# Patient Record
Sex: FEMALE | Race: OTHER | HISPANIC OR LATINO | URBAN - METROPOLITAN AREA
[De-identification: names, ages, dates, MRNs, and addresses within clinical notes are randomized per-mention and may not be internally consistent; named-entity substitution may affect disease eponyms.]

---
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
## 2021-06-05 NOTE — CONSULT NOTE PEDS - ASSESSMENT
18 yo F with hx of renal cyst admitted with NARA in setting of viral gastroenteritis and thrombocytopenia. Her creatinine is improving with IVF alone, which is suggestive of a pre-renal etiology. Other causes of NARA associated with thrombocytopenia include HUS, SLE, and *****.  Should also evaluate for *****.     1.   2. 16 yo F with hx of renal cyst admitted with NARA in setting of viral gastroenteritis and thrombocytopenia. Her creatinine is improving with IVF alone, which is suggestive of a pre-renal etiology. Causes of NARA associated with thrombocytopenia include HUS and SLE. Also recommend a complete GN workup as indicated below.     1. Repeat CMP, Mg, Phos this evening to trend Creatinine  2. Continue IVF  3. Send CATERINA, dsDNA, ANCA for SLE evaluation  4. GI PCR for HUS evaluation   5. ASLO for PSGN   6. Urine eosinophil count for AIN   7. Follow up C3, C4  8. Follow up Urine Cx   9. Heme following, Rheum c/s pending 16 yo F with hx of renal cyst admitted with NARA in setting of viral gastroenteritis and thrombocytopenia. Her creatinine is improving with IVF alone, which is suggestive of a pre-renal etiology, although also with concern for interstitial nephritis possibly related to Motrin. Causes of NARA associated with thrombocytopenia include HUS and SLE. Also recommend a complete GN workup as indicated below.     1. Repeat CMP, Mg, Phos this evening to trend Creatinine  2. Continue IVF  3. Send CATERINA, dsDNA, ANCA for SLE evaluation  4. GI PCR for HUS evaluation   5. ASLO for PSGN   6. Urine eosinophil count for AIN   7. Follow up C3, C4  8. Follow up Urine Cx   9. Heme following, Rheum c/s pending 36.9

## 2021-12-09 ENCOUNTER — EMERGENCY (EMERGENCY)
Facility: HOSPITAL | Age: 21
LOS: 1 days | Discharge: HOME | End: 2021-12-09
Attending: EMERGENCY MEDICINE
Payer: MEDICAID

## 2021-12-09 VITALS
HEART RATE: 76 BPM | HEIGHT: 61 IN | TEMPERATURE: 98 F | DIASTOLIC BLOOD PRESSURE: 73 MMHG | RESPIRATION RATE: 16 BRPM | SYSTOLIC BLOOD PRESSURE: 109 MMHG | OXYGEN SATURATION: 98 % | WEIGHT: 169.09 LBS

## 2021-12-09 LAB
ALBUMIN SERPL ELPH-MCNC: 4 G/DL — SIGNIFICANT CHANGE UP (ref 3.5–5)
ALP SERPL-CCNC: 113 U/L — SIGNIFICANT CHANGE UP (ref 40–120)
ALT FLD-CCNC: 48 U/L DA — SIGNIFICANT CHANGE UP (ref 10–60)
ANION GAP SERPL CALC-SCNC: 6 MMOL/L — SIGNIFICANT CHANGE UP (ref 5–17)
APPEARANCE UR: CLEAR — SIGNIFICANT CHANGE UP
AST SERPL-CCNC: 18 U/L — SIGNIFICANT CHANGE UP (ref 10–40)
BACTERIA # UR AUTO: ABNORMAL /HPF
BASOPHILS # BLD AUTO: 0.09 K/UL — SIGNIFICANT CHANGE UP (ref 0–0.2)
BASOPHILS NFR BLD AUTO: 0.8 % — SIGNIFICANT CHANGE UP (ref 0–2)
BILIRUB SERPL-MCNC: 0.3 MG/DL — SIGNIFICANT CHANGE UP (ref 0.2–1.2)
BILIRUB UR-MCNC: NEGATIVE — SIGNIFICANT CHANGE UP
BUN SERPL-MCNC: 11 MG/DL — SIGNIFICANT CHANGE UP (ref 7–18)
CALCIUM SERPL-MCNC: 8.8 MG/DL — SIGNIFICANT CHANGE UP (ref 8.4–10.5)
CHLORIDE SERPL-SCNC: 107 MMOL/L — SIGNIFICANT CHANGE UP (ref 96–108)
CO2 SERPL-SCNC: 26 MMOL/L — SIGNIFICANT CHANGE UP (ref 22–31)
COLOR SPEC: YELLOW — SIGNIFICANT CHANGE UP
CREAT SERPL-MCNC: 0.55 MG/DL — SIGNIFICANT CHANGE UP (ref 0.5–1.3)
DIFF PNL FLD: ABNORMAL
EOSINOPHIL # BLD AUTO: 0.2 K/UL — SIGNIFICANT CHANGE UP (ref 0–0.5)
EOSINOPHIL NFR BLD AUTO: 1.7 % — SIGNIFICANT CHANGE UP (ref 0–6)
EPI CELLS # UR: SIGNIFICANT CHANGE UP /HPF
GLUCOSE SERPL-MCNC: 88 MG/DL — SIGNIFICANT CHANGE UP (ref 70–99)
GLUCOSE UR QL: NEGATIVE — SIGNIFICANT CHANGE UP
HCG UR QL: NEGATIVE — SIGNIFICANT CHANGE UP
HCT VFR BLD CALC: 38.9 % — SIGNIFICANT CHANGE UP (ref 34.5–45)
HGB BLD-MCNC: 13.4 G/DL — SIGNIFICANT CHANGE UP (ref 11.5–15.5)
IMM GRANULOCYTES NFR BLD AUTO: 0.2 % — SIGNIFICANT CHANGE UP (ref 0–1.5)
KETONES UR-MCNC: NEGATIVE — SIGNIFICANT CHANGE UP
LEUKOCYTE ESTERASE UR-ACNC: NEGATIVE — SIGNIFICANT CHANGE UP
LIDOCAIN IGE QN: 69 U/L — LOW (ref 73–393)
LYMPHOCYTES # BLD AUTO: 3.7 K/UL — HIGH (ref 1–3.3)
LYMPHOCYTES # BLD AUTO: 31.7 % — SIGNIFICANT CHANGE UP (ref 13–44)
MCHC RBC-ENTMCNC: 30 PG — SIGNIFICANT CHANGE UP (ref 27–34)
MCHC RBC-ENTMCNC: 34.4 GM/DL — SIGNIFICANT CHANGE UP (ref 32–36)
MCV RBC AUTO: 87.2 FL — SIGNIFICANT CHANGE UP (ref 80–100)
MONOCYTES # BLD AUTO: 0.78 K/UL — SIGNIFICANT CHANGE UP (ref 0–0.9)
MONOCYTES NFR BLD AUTO: 6.7 % — SIGNIFICANT CHANGE UP (ref 2–14)
NEUTROPHILS # BLD AUTO: 6.9 K/UL — SIGNIFICANT CHANGE UP (ref 1.8–7.4)
NEUTROPHILS NFR BLD AUTO: 58.9 % — SIGNIFICANT CHANGE UP (ref 43–77)
NITRITE UR-MCNC: POSITIVE
NRBC # BLD: 0 /100 WBCS — SIGNIFICANT CHANGE UP (ref 0–0)
PH UR: 5 — SIGNIFICANT CHANGE UP (ref 5–8)
PLATELET # BLD AUTO: 241 K/UL — SIGNIFICANT CHANGE UP (ref 150–400)
POTASSIUM SERPL-MCNC: 3.7 MMOL/L — SIGNIFICANT CHANGE UP (ref 3.5–5.3)
POTASSIUM SERPL-SCNC: 3.7 MMOL/L — SIGNIFICANT CHANGE UP (ref 3.5–5.3)
PROT SERPL-MCNC: 7.8 G/DL — SIGNIFICANT CHANGE UP (ref 6–8.3)
PROT UR-MCNC: 15
RBC # BLD: 4.46 M/UL — SIGNIFICANT CHANGE UP (ref 3.8–5.2)
RBC # FLD: 12.4 % — SIGNIFICANT CHANGE UP (ref 10.3–14.5)
RBC CASTS # UR COMP ASSIST: SIGNIFICANT CHANGE UP /HPF (ref 0–2)
SODIUM SERPL-SCNC: 139 MMOL/L — SIGNIFICANT CHANGE UP (ref 135–145)
SP GR SPEC: 1.02 — SIGNIFICANT CHANGE UP (ref 1.01–1.02)
UROBILINOGEN FLD QL: NEGATIVE — SIGNIFICANT CHANGE UP
WBC # BLD: 11.69 K/UL — HIGH (ref 3.8–10.5)
WBC # FLD AUTO: 11.69 K/UL — HIGH (ref 3.8–10.5)
WBC UR QL: SIGNIFICANT CHANGE UP /HPF (ref 0–5)

## 2021-12-09 PROCEDURE — 76856 US EXAM PELVIC COMPLETE: CPT | Mod: 26

## 2021-12-09 PROCEDURE — 81001 URINALYSIS AUTO W/SCOPE: CPT

## 2021-12-09 PROCEDURE — 76830 TRANSVAGINAL US NON-OB: CPT

## 2021-12-09 PROCEDURE — 74177 CT ABD & PELVIS W/CONTRAST: CPT | Mod: MA

## 2021-12-09 PROCEDURE — 87086 URINE CULTURE/COLONY COUNT: CPT

## 2021-12-09 PROCEDURE — 87186 SC STD MICRODIL/AGAR DIL: CPT

## 2021-12-09 PROCEDURE — 76830 TRANSVAGINAL US NON-OB: CPT | Mod: 26

## 2021-12-09 PROCEDURE — 96360 HYDRATION IV INFUSION INIT: CPT | Mod: XU

## 2021-12-09 PROCEDURE — 99285 EMERGENCY DEPT VISIT HI MDM: CPT

## 2021-12-09 PROCEDURE — 80053 COMPREHEN METABOLIC PANEL: CPT

## 2021-12-09 PROCEDURE — 36415 COLL VENOUS BLD VENIPUNCTURE: CPT

## 2021-12-09 PROCEDURE — 85025 COMPLETE CBC W/AUTO DIFF WBC: CPT

## 2021-12-09 PROCEDURE — 81025 URINE PREGNANCY TEST: CPT

## 2021-12-09 PROCEDURE — 99284 EMERGENCY DEPT VISIT MOD MDM: CPT | Mod: 25

## 2021-12-09 PROCEDURE — 76856 US EXAM PELVIC COMPLETE: CPT

## 2021-12-09 PROCEDURE — 74177 CT ABD & PELVIS W/CONTRAST: CPT | Mod: 26,MA

## 2021-12-09 PROCEDURE — 83690 ASSAY OF LIPASE: CPT

## 2021-12-09 RX ORDER — KETOROLAC TROMETHAMINE 30 MG/ML
15 SYRINGE (ML) INJECTION ONCE
Refills: 0 | Status: DISCONTINUED | OUTPATIENT
Start: 2021-12-09 | End: 2021-12-09

## 2021-12-09 RX ORDER — SODIUM CHLORIDE 9 MG/ML
1000 INJECTION INTRAMUSCULAR; INTRAVENOUS; SUBCUTANEOUS ONCE
Refills: 0 | Status: COMPLETED | OUTPATIENT
Start: 2021-12-09 | End: 2021-12-09

## 2021-12-09 RX ORDER — CEPHALEXIN 500 MG
500 CAPSULE ORAL DAILY
Refills: 0 | Status: DISCONTINUED | OUTPATIENT
Start: 2021-12-09 | End: 2021-12-13

## 2021-12-09 RX ORDER — CEPHALEXIN 500 MG
1 CAPSULE ORAL
Qty: 10 | Refills: 0
Start: 2021-12-09 | End: 2021-12-13

## 2021-12-09 RX ADMIN — SODIUM CHLORIDE 1000 MILLILITER(S): 9 INJECTION INTRAMUSCULAR; INTRAVENOUS; SUBCUTANEOUS at 16:01

## 2021-12-09 RX ADMIN — Medication 500 MILLIGRAM(S): at 20:37

## 2021-12-09 RX ADMIN — SODIUM CHLORIDE 1000 MILLILITER(S): 9 INJECTION INTRAMUSCULAR; INTRAVENOUS; SUBCUTANEOUS at 17:01

## 2021-12-09 NOTE — ED PROVIDER NOTE - NSFOLLOWUPINSTRUCTIONS_ED_ALL_ED_FT
Follow up with your primary care doctor within 2 days.     Take antibiotics until all of the medication is completed, even if you are feeling better.    If you experience any new or worsening symptoms or if you are concerned you can always come back to the emergency for a re-evaluation.    If there were any prescriptions given to you during the visit today take them as prescribed. If you have any questions you can ask the pharmacist.

## 2021-12-09 NOTE — ED ADULT NURSE NOTE - OBJECTIVE STATEMENT
pt is a 22 y/o female w/ c/o abdominal pain. abdomen soft non distended + nausea . abdomen non distended

## 2021-12-09 NOTE — ED ADULT NURSE REASSESSMENT NOTE - NS ED NURSE REASSESS COMMENT FT1
Feels better .Declined repeat VS .Heplock removed from left ante cubital area ,no redness ,swelling noted heplock site .
2102 pm - pt states  she is felling better no signs of any discomfort at this time . pt  states  she is feeling better.

## 2021-12-09 NOTE — ED ADULT NURSE NOTE - CAS DISCH CONDITION
The pt is a 36 y/o F, who presents to ED c/o panic attack a few hrs ago. Pt states that "partied hard", states that had 3 mixed drinks, smoked cigarette, and had a pot brownie, then started to feel "impending doom". Currently feeling better. Denies cp, sob, n/v, h/a, dizziness, syncope
Stable

## 2021-12-09 NOTE — ED ADULT NURSE NOTE - NSIMPLEMENTINTERV_GEN_ALL_ED
Implemented All Universal Safety Interventions:  [place] to call system. Call bell, personal items and telephone within reach. Instruct patient to call for assistance. Room bathroom lighting operational. Non-slip footwear when patient is off stretcher. Physically safe environment: no spills, clutter or unnecessary equipment. Stretcher in lowest position, wheels locked, appropriate side rails in place.

## 2021-12-09 NOTE — ED PROVIDER NOTE - OBJECTIVE STATEMENT
21 year old female with no significant PMHx presenting to the ED upon referral by her pediatrician for evaluation of RLQ abdominal pain for four days. Patient denies having any associated nausea, vomiting, diarrhea, fevers, or any urinary symptoms. Patient notes that her LMP was two weeks ago. Patient's pediatrician reportedly sent her to the ED to possibly undergo a CAT scan or ultrasound. NKDA.

## 2021-12-09 NOTE — ED PROVIDER NOTE - PATIENT PORTAL LINK FT
You can access the FollowMyHealth Patient Portal offered by [facility] by registering at the following website: [email or web address]. By joining [employer]’s FollowMyHealth portal, you will also be able to view your health information using other applications (apps) compatible with our system.

## 2022-01-04 ENCOUNTER — EMERGENCY (EMERGENCY)
Facility: HOSPITAL | Age: 22
LOS: 1 days | Discharge: HOME | End: 2022-01-04
Attending: EMERGENCY MEDICINE | Admitting: EMERGENCY MEDICINE
Payer: MEDICAID

## 2022-01-04 VITALS
TEMPERATURE: 98 F | HEIGHT: 61 IN | RESPIRATION RATE: 18 BRPM | SYSTOLIC BLOOD PRESSURE: 121 MMHG | DIASTOLIC BLOOD PRESSURE: 83 MMHG | HEART RATE: 88 BPM

## 2022-01-04 PROBLEM — Z78.9 OTHER SPECIFIED HEALTH STATUS: Chronic | Status: ACTIVE | Noted: 2021-12-09

## 2022-01-04 PROCEDURE — 73630 X-RAY EXAM OF FOOT: CPT | Mod: 26,RT

## 2022-01-04 PROCEDURE — 73610 X-RAY EXAM OF ANKLE: CPT | Mod: 26,RT

## 2022-01-04 PROCEDURE — 99283 EMERGENCY DEPT VISIT LOW MDM: CPT

## 2022-01-04 RX ORDER — ACETAMINOPHEN 500 MG
975 TABLET ORAL ONCE
Refills: 0 | Status: COMPLETED | OUTPATIENT
Start: 2022-01-04 | End: 2022-01-04

## 2022-01-04 RX ADMIN — Medication 975 MILLIGRAM(S): at 10:53

## 2022-01-04 NOTE — ED ADULT TRIAGE NOTE - CHIEF COMPLAINT QUOTE
Pt AOX4 c/o Right ankle injury, slipped and fell on stairs twisted ankle; pain 7/10 at this time; able to bear weight with difficulty

## 2022-01-04 NOTE — ED PROVIDER NOTE - OBJECTIVE STATEMENT
21F no PMH p/e R ankle pain after twisting ankle. Pt was going down the stairs and approx 2-3 stairs from the bottle twisted her ankle and fell onto R side. No head trauma, LOC, not on AC. Denies pain anywhere but ankle. Pain over lateral aspect ankle. Denies numbness/tingling/weakness.

## 2022-01-04 NOTE — ED PROVIDER NOTE - PRINCIPAL DIAGNOSIS
PRINCIPAL DISCHARGE DIAGNOSIS  Diagnosis: SOB (shortness of breath)  Assessment and Plan of Treatment: You came in with shortness of breath, which improved after a course of antibiotics, steroids, and nebulizers. Pneumonia was ruled out, and pulmonology believes the opacities that were seen on CT scan are inflammatory in nature. You will continue the steroids as prescribed until you follow up with pulmonology in clinic, and will have a repeat CT scan in 3-4 weeks. Seek a medical professional immediately if you have increased shortness of breath, chest pain, worsening cough.      SECONDARY DISCHARGE DIAGNOSES  Diagnosis: Thoracic compression fracture  Assessment and Plan of Treatment: You have two compression fractures in your spine, which are most likely the result of osteoporosis. Neurosurgery did not want to do anything for them, and your pain was controlled with a regimen of pain meds. You should gradually increase activity while doing rehabilitation. Seek a medical professional immediately if you notice numbness or tingling in your extremities, bladder incontinence, difficulty walking. Ankle pain

## 2022-01-04 NOTE — ED PROVIDER NOTE - PHYSICAL EXAMINATION
GEN - NAD; well appearing; A+O x3   HEAD - NC/AT   ENT: Airway patent, mmm  NECK: Neck supple  PULMONARY - Non labored breathing  EXTREMITIES - moving all four extremities, TTP and mild swelling to R lateral malleolus, no pain to palpation of knee, foot, DP pulse 2+, SILT throughout   NEUROLOGIC - alert, speech clear, normal gait  PSYCH -nl mood/affect, nl insight.

## 2022-01-04 NOTE — ED PROVIDER NOTE - PATIENT PORTAL LINK FT
You can access the FollowMyHealth Patient Portal offered by [facility] by registering at the following website: [email or web address]. By joining [employer]’s FollowMyHealth portal, you will also be able to view your health information using other applications (apps) compatible with our system.

## 2022-01-04 NOTE — ED PROVIDER NOTE - NSFOLLOWUPINSTRUCTIONS_ED_ALL_ED_FT
You have sprained your ankle, no bones are broken as seen on the xray. You can bear weight as tolerated, if you have worsening pain or swelling, please return to the ER. Otherwise, please follow up with an orthopedist. Our discharge planners will call you to help set up the appointment.

## 2022-01-04 NOTE — ED PROVIDER NOTE - CLINICAL SUMMARY MEDICAL DECISION MAKING FREE TEXT BOX
Pt p/w R ankle after twisting injury going down stairs. No other injuries notes, neurovasc intact, plan for XR foot and ankle r/o fracture.

## 2023-01-27 NOTE — ED PEDIATRIC NURSE NOTE - FALLEN IN THE PAST
The patient is Stable - Low risk of patient condition declining or worsening    Shift Goals  Clinical Goals: Monitor s/p overdose/resp failure  Patient Goals: Eat  Family Goals: ALE    Progress made toward(s) clinical / shift goals:  Continous o2 monitor in place, narcan prn for respiratory failure.   Problem: Knowledge Deficit - Standard  Goal: Patient and family/care givers will demonstrate understanding of plan of care, disease process/condition, diagnostic tests and medications  Outcome: Progressing     Problem: Respiratory  Goal: Patient will achieve/maintain optimum respiratory ventilation and gas exchange  Outcome: Progressing       Patient is not progressing towards the following goals:       no

## 2023-01-28 NOTE — ED PROVIDER NOTE - NSICDXPASTSURGICALHX_GEN_ALL_CORE_FT
"Presents with left sided chest , that's intermittent. Pain described as sharp, non radiating, "spasm" that comes and goes. Pt denies any increase in SOB, pt is on O2.  " PAST SURGICAL HISTORY:  No significant past surgical history     No significant past surgical history

## 2023-02-27 ENCOUNTER — OFFICE (OUTPATIENT)
Dept: URBAN - METROPOLITAN AREA CLINIC 63 | Facility: CLINIC | Age: 23
Setting detail: OPHTHALMOLOGY
End: 2023-02-27
Payer: MEDICAID

## 2023-02-27 DIAGNOSIS — H16.223: ICD-10-CM

## 2023-02-27 DIAGNOSIS — H52.13: ICD-10-CM

## 2023-02-27 PROCEDURE — 92015 DETERMINE REFRACTIVE STATE: CPT | Performed by: STUDENT IN AN ORGANIZED HEALTH CARE EDUCATION/TRAINING PROGRAM

## 2023-02-27 PROCEDURE — 92004 COMPRE OPH EXAM NEW PT 1/>: CPT | Performed by: STUDENT IN AN ORGANIZED HEALTH CARE EDUCATION/TRAINING PROGRAM

## 2023-02-27 ASSESSMENT — REFRACTION_MANIFEST
OD_SPHERE: -1.25
OD_VA1: 20/20
OD_AXIS: 130
OS_SPHERE: -0.75
OS_CYLINDER: -0.50
OU_VA: 20/20
OS_VA1: 20/20
OS_AXIS: 050
OD_CYLINDER: -0.25

## 2023-02-27 ASSESSMENT — DECREASING TEAR LAKE - SEVERITY SCORE
OS_DEC_TEARLAKE: 1+
OD_DEC_TEARLAKE: 1+

## 2023-02-27 ASSESSMENT — AXIALLENGTH_DERIVED
OD_AL: 24.6305
OD_AL: 24.6305
OS_AL: 24.6214
OS_AL: 24.6214

## 2023-02-27 ASSESSMENT — REFRACTION_AUTOREFRACTION
OS_CYLINDER: -0.50
OS_SPHERE: -0.75
OD_SPHERE: -1.25
OD_AXIS: 131
OS_AXIS: 049
OD_CYLINDER: -0.25

## 2023-02-27 ASSESSMENT — SPHEQUIV_DERIVED
OS_SPHEQUIV: -1
OD_SPHEQUIV: -1.375
OD_SPHEQUIV: -1.375
OS_SPHEQUIV: -1

## 2023-02-27 ASSESSMENT — SUPERFICIAL PUNCTATE KERATITIS (SPK)
OS_SPK: 1+
OD_SPK: 1+

## 2023-02-27 ASSESSMENT — KERATOMETRY
OS_AXISANGLE_DEGREES: 097
OD_K1POWER_DIOPTERS: 41.75
OS_K2POWER_DIOPTERS: 42.00
OS_K1POWER_DIOPTERS: 41.75
OD_K2POWER_DIOPTERS: 42.75
OD_AXISANGLE_DEGREES: 095

## 2023-02-27 ASSESSMENT — CONFRONTATIONAL VISUAL FIELD TEST (CVF)
OD_FINDINGS: FULL
OS_FINDINGS: FULL

## 2023-02-27 ASSESSMENT — VISUAL ACUITY
OS_BCVA: 20/30-1
OD_BCVA: 20/20-1

## 2023-03-09 NOTE — ED PROVIDER NOTE - GASTROINTESTINAL [+], MLM
No. DOMINIQUE screening performed.  STOP BANG Legend: 0-2 = LOW Risk; 3-4 = INTERMEDIATE Risk; 5-8 = HIGH Risk ABDOMINAL PAIN

## 2023-05-10 NOTE — ED PROVIDER NOTE - WR ORDER DATE AND TIME 1
never used smokeless tobacco.. Discussed with patient the risks and benefits of screening, including over-diagnosis, false positive rate, and total radiation exposure. The patient currently exhibits no signs or symptoms suggestive of lung cancer. Discussed with patient the importance of compliance with yearly annual lung cancer screenings and willingness to undergo diagnosis and treatment if screening scan is positive. In addition, the patient was counseled regarding the importance of remaining smoke free and/or total smoking cessation. Also reviewed the following if the patient has Medicare that as of February 10, 2022, Medicare only covers LDCT screening in patients aged 51-72 with at least a 20 pack-year smoking history who currently smoke or have quit in the last 15 yearstion of spoken language may be erroneous, or at times, nonsensical words or phrases may be inadvertently transcribed.  Although I have reviewed the note for sucherrors, some may still exist.
04-Jan-2022 10:05

## 2024-05-21 NOTE — PATIENT PROFILE PEDIATRIC. - NS PRO CL COPING
Coping Well
on the discharge service for the patient. I have reviewed and made amendments to the documentation where necessary.

## 2024-07-23 NOTE — ED PROVIDER NOTE - CHPI ED SYMPTOMS NEG
Anesthesia Evaluation     Patient summary reviewed and Nursing notes reviewed   history of anesthetic complications (not with propofol only technique):  PONV               Airway   Mallampati: III  TM distance: >3 FB  Neck ROM: limited  Large neck circumference and Possible difficult intubation  Dental      Pulmonary    (+) ,sleep apnea on CPAP  Cardiovascular     ECG reviewed  Rhythm: regular  Rate: normal    (+) hypertension, dysrhythmias (currently in NSR with 1st degree block) Paroxysmal Atrial Fib      Neuro/Psych  (+) TIA, CVA, numbness  GI/Hepatic/Renal/Endo    (+) GERD, renal disease- stones, diabetes mellitus type 2    Musculoskeletal     (+) neck pain  Abdominal    Substance History - negative use     OB/GYN negative ob/gyn ROS         Other   arthritis,                 Anesthesia Plan    ASA 3     MAC     (S/p C6-C7 plating  BMI  PAF in NSR  H/O PVC's in a bigeminal pattern)  intravenous induction     Anesthetic plan, risks, benefits, and alternatives have been provided, discussed and informed consent has been obtained with: patient.    CODE STATUS:          no urinary symptoms/no diarrhea/no fever/no nausea/no vomiting

## 2024-10-08 NOTE — PATIENT PROFILE PEDIATRIC. - FUNCTIONAL SCREEN CURRENT LEVEL: TOILETING, MLM
Refill Decision Note   Hung Macario  is requesting a refill authorization.  Brief Assessment and Rationale for Refill:  Approve     Medication Therapy Plan:         Comments:     Note composed:2:49 AM 10/08/2024                (0) independent